# Patient Record
Sex: FEMALE | Race: ASIAN | NOT HISPANIC OR LATINO
[De-identification: names, ages, dates, MRNs, and addresses within clinical notes are randomized per-mention and may not be internally consistent; named-entity substitution may affect disease eponyms.]

---

## 2023-03-09 ENCOUNTER — APPOINTMENT (OUTPATIENT)
Dept: ORTHOPEDIC SURGERY | Facility: CLINIC | Age: 78
End: 2023-03-09
Payer: MEDICARE

## 2023-03-09 VITALS — BODY MASS INDEX: 24.16 KG/M2 | HEIGHT: 65 IN | WEIGHT: 145 LBS

## 2023-03-09 DIAGNOSIS — M54.17 RADICULOPATHY, LUMBOSACRAL REGION: ICD-10-CM

## 2023-03-09 DIAGNOSIS — Z78.9 OTHER SPECIFIED HEALTH STATUS: ICD-10-CM

## 2023-03-09 DIAGNOSIS — M51.9 UNSPECIFIED THORACIC, THORACOLUMBAR AND LUMBOSACRAL INTERVERTEBRAL DISC DISORDER: ICD-10-CM

## 2023-03-09 DIAGNOSIS — Q76.49 OTHER CONGENITAL MALFORMATIONS OF SPINE, NOT ASSOCIATED WITH SCOLIOSIS: ICD-10-CM

## 2023-03-09 PROCEDURE — 99072 ADDL SUPL MATRL&STAF TM PHE: CPT

## 2023-03-09 PROCEDURE — 99204 OFFICE O/P NEW MOD 45 MIN: CPT

## 2023-03-09 PROCEDURE — 72110 X-RAY EXAM L-2 SPINE 4/>VWS: CPT

## 2023-03-09 PROCEDURE — 72170 X-RAY EXAM OF PELVIS: CPT

## 2023-03-09 RX ORDER — LIDOCAINE 40 MG/G
4 PATCH TOPICAL
Qty: 30 | Refills: 2 | Status: ACTIVE | COMMUNITY
Start: 2023-03-09 | End: 1900-01-01

## 2023-03-09 NOTE — IMAGING
[Facet arthropathy] : Facet arthropathy [Disc space narrowing] : Disc space narrowing [AP] : anteroposterior [There are no fractures, subluxations or dislocations. No significant abnormalities are seen] : There are no fractures, subluxations or dislocations. No significant abnormalities are seen [Moderate arthritis (Tonnis Grade 2)] : Moderate arthritis (Tonnis Grade 2) [de-identified] : LSPINE\par Inspection: No rash or ecchymosis\par Palpation:midline lumbar tenderness, left lumbar paraspinal tenderness\par ROM: diminished all planes\par Strength: 5/5 bilateral hip flexors, knee extensors, ankle dorsiflexors, EHL, ankle plantarflexors\par Sensation: Sensation present to light touch bilateral L2-S1 distributions\par Provocative maneuvers: Negative bilateral straight leg raise\par \par \par Bilateral hips-\par Palpation: No tenderness to palpation over greater trochanter or IT band\par \par  [FreeTextEntry1] : DDD most severe L5-S1\par chronic appearing vertebral wedging L4\par Bertolotti's syndrome (left side)

## 2023-03-09 NOTE — HISTORY OF PRESENT ILLNESS
[Lower back] : lower back [Sudden] : sudden [7] : 7 [Radiating] : radiating [Tingling] : tingling [Constant] : constant [Physical therapy] : physical therapy [Standing] : standing [Walking] : walking [Retired] : Work status: retired [de-identified] : 3/9/23- 78 y/o F presents for lower back pain X 6-7 years. Associated radiation down BLEs (bilateral anterior shins/posterior calves/dorsal feet), with N/T. Denies recent injuries. Aggravated by prolonged walking and going up/down stairs. Has done PT in the past, most recently 2 months ago, with relief. Denies prior back surgeries. Denies b/b dysfunction. Had lumbar MRI done approx. 1 year ago (does not have report/images). \par \par PMH: GERD [] : no [de-identified] : about 2 months ago [de-identified] : mri

## 2023-03-09 NOTE — ASSESSMENT
[FreeTextEntry1] : lumbosacral DDD with BLE radic\par left-sided Bertolotti's syndrome \par chronic-appearing vertebral wedging L4; no reported acute trauma\par PT, topicals\par f/u 6 weeks\par consider imaging if no improvement\par \par Patient seen by Brigette Bahena PA-C,  with and under the supervision of Dr. Khoi Mendoza M.D.\par

## 2023-03-17 ENCOUNTER — APPOINTMENT (OUTPATIENT)
Dept: ORTHOPEDIC SURGERY | Facility: CLINIC | Age: 78
End: 2023-03-17
Payer: MEDICARE

## 2023-03-17 VITALS — WEIGHT: 145 LBS | HEIGHT: 65 IN | BODY MASS INDEX: 24.16 KG/M2

## 2023-03-17 DIAGNOSIS — M17.11 UNILATERAL PRIMARY OSTEOARTHRITIS, RIGHT KNEE: ICD-10-CM

## 2023-03-17 DIAGNOSIS — M17.12 UNILATERAL PRIMARY OSTEOARTHRITIS, LEFT KNEE: ICD-10-CM

## 2023-03-17 DIAGNOSIS — G89.29 LOW BACK PAIN, UNSPECIFIED: ICD-10-CM

## 2023-03-17 DIAGNOSIS — M54.50 LOW BACK PAIN, UNSPECIFIED: ICD-10-CM

## 2023-03-17 PROCEDURE — 99072 ADDL SUPL MATRL&STAF TM PHE: CPT

## 2023-03-17 PROCEDURE — 73564 X-RAY EXAM KNEE 4 OR MORE: CPT | Mod: 50

## 2023-03-17 PROCEDURE — 99213 OFFICE O/P EST LOW 20 MIN: CPT

## 2023-03-17 NOTE — HISTORY OF PRESENT ILLNESS
[Gradual] : gradual [8] : 8 [Dull/Aching] : dull/aching [Sharp] : sharp [Nothing helps with pain getting better] : Nothing helps with pain getting better [Retired] : Work status: retired [de-identified] : 03/17/2023 Ms. NANETTE JONAS is a 78 years old  F who  presents today for evaluation of Norris knee. pt states she has arthritis. she has been experiencing pain for the past 7 years  [] : no

## 2023-03-17 NOTE — IMAGING
[de-identified] : Constitutional: well developed and well nourished, able to communicate\par Cardiovascular: Peripheral vascular exam is grossly normal\par Neurologic: Alert and oriented, no acute distress.\par Skin: normal skin with no ulcers, rashes, or lesions\par Pulmonary: No respiratory distress, breathing comfortably on room air\par Lymphatics: No obvious lymphadenopathy or lymphedema in areas examined\par \par RIGHT KNEE EXAM\par Alignment: Varus \par Effusion: None\par Atrophy: None                                                 \par Stable to Varus/valgus stress\par Posterior Drawer Test: negative\par Anterior Drawer Test: Negative\par Knee Extension/Flexion: 0 / 120\par \par Medial/lateral compartments\par Medial joint line: No Tenderness\par Lateral joint line: No Tenderness\par Taty test: negative\par \par Patellofemoral joint\par Medial patellar facet: no tenderness\par Patellar grind: Negative\par \par Tendons:\par Pes Anserine: No tenderness\par Gerdy’s Tubercle/ IT Band: No tenderness\par Quadriceps Tendon: No Tenderness\par patellar tendon: no Tenderness\par Tibial tubercle: not tenderness\par Calf: no Tenderness\par \par Neurovascular exam\par Muscle strength: 5/5\par Sensation to light touch: intact\par Distal pulses: 2+\par \par LEFT KNEE EXAM\par Alignment: Valgus\par Effusion: None\par Atrophy: None                                                 \par Stable to Varus/valgus stress\par Posterior Drawer Test: negative\par Anterior Drawer Test: Negative\par Knee Extension/Flexion: 0 / 120\par \par Medial/lateral compartments\par Medial joint line: No Tenderness\par Lateral joint line: No Tenderness\par Taty test: negative\par \par Patellofemoral joint\par Medial patellar facet: no tenderness\par Patellar grind: Negative\par \par Tendons:\par Pes Anserine: No tenderness\par Gerdy’s Tubercle/ IT Band: No tenderness\par Quadriceps Tendon: No Tenderness\par patellar tendon: no Tenderness\par Tibial tubercle: not tenderness\par Calf: no Tenderness\par \par Neurovascular exam\par Muscle strength: 5/5\par Sensation to light touch: intact\par Distal pulses: 2+\par \par IMAGING:\par 03/17/2023 Xrays of the Left Knee were taken demonstrating \par \par IMAGING:\par 03/17/2023 Xrays of the bilateral Knee were taken demonstrating Right knee medial compartment wear and left knee lateral compartment wear. tricompartmental arthritis with joint space collapse, osteophytes, and sclerosis

## 2023-03-17 NOTE — ASSESSMENT
[FreeTextEntry1] : 78 year F with bilateral knee OA and lumbago\par - physical therapy, NSAIDs\par - Return in 6 weeks for follow up PRN

## 2023-05-05 ENCOUNTER — APPOINTMENT (OUTPATIENT)
Dept: ORTHOPEDIC SURGERY | Facility: CLINIC | Age: 78
End: 2023-05-05

## 2023-08-24 ENCOUNTER — APPOINTMENT (OUTPATIENT)
Dept: OTOLARYNGOLOGY | Facility: CLINIC | Age: 78
End: 2023-08-24
Payer: MEDICARE

## 2023-08-24 VITALS
WEIGHT: 140 LBS | OXYGEN SATURATION: 98 % | HEART RATE: 70 BPM | BODY MASS INDEX: 23.32 KG/M2 | HEIGHT: 65 IN | DIASTOLIC BLOOD PRESSURE: 76 MMHG | SYSTOLIC BLOOD PRESSURE: 134 MMHG

## 2023-08-24 PROCEDURE — 31575 DIAGNOSTIC LARYNGOSCOPY: CPT

## 2023-08-24 PROCEDURE — 99203 OFFICE O/P NEW LOW 30 MIN: CPT | Mod: 25

## 2023-08-24 NOTE — ASSESSMENT
[FreeTextEntry1] : Ms. JONAS is a 78 year female s/p one episode of vomiting with small amount of blood 5 weeks ago with no further incidences. On scope she does have + evidence of reflux, no masses seen today- no evidence of upper airway bleeding   LPRD - can c/w PPI and Famotidine until seen by GI - keep f/u with GI next month

## 2023-08-24 NOTE — REASON FOR VISIT
[Initial Consultation] : an initial consultation for [Family Member] : family member [FreeTextEntry2] : blood in sputum

## 2023-08-24 NOTE — CONSULT LETTER
[Dear  ___] : Dear  [unfilled], [Consult Letter:] : I had the pleasure of evaluating your patient, [unfilled]. [Sincerely,] : Sincerely, [DrGeorge  ___] : Dr. GOMEZ [FreeTextEntry2] : [facility] [FreeTextEntry3] : Padmini Lisa MD Otolaryngology- Facial Plastics  [address] [place] [id number] (P) - [phone] (F) - [phone]

## 2023-08-24 NOTE — PROCEDURE
[de-identified] : reason for laryngoscopy: unable to cooperate with mirror   Fiberoptic laryngoscopy was performed on the patient.  R/b/a was explained to the patient and they agreed to proceed with procedure.  Nasal cavities were treated with lidocaine and afrin prior to laryngoscopy for comfort.  Nasal cavities: clear b/l, Nasopharynx: mild erythema and swelling, Oropharynx/Hypopharynx: + lingual tonsillar hypertrophy no masses or lesions, posterior pharyngeal wall with cobblestoning.  No BOT hypertrophy, vallecula is clear of any masses or cysts, Supraglottis: epiglottis sharp with normal bilateral arytenoids, aryepiglottic folds, ventricles but with + interarytenoid edema consistent with reflux, Glottis: clear, TVCs mobile b/l.  Subglottis clear  No pooling of secretions in the pyriform sinus.  Airway patent  Scope #: 210

## 2023-08-24 NOTE — HISTORY OF PRESENT ILLNESS
[de-identified] : Ms. JONAS is a 78 year female here with son pt was treated with Ciprofloxin for UTI 5 weeks ago which caused stomach upset, at that time she coughed one time, vomited and noticed there was blood in the vomit. denies sob. she was seen by GI who started her on Omeprazole 40mg q am and Pepcid qpm for reflux and referred her to ENT - takes Asa 81 mg on occasion - denies epistaxis or tobacco history  denies sinus pressure, rhinorrhea, nasal obstruction

## 2023-10-11 ENCOUNTER — APPOINTMENT (OUTPATIENT)
Dept: GASTROENTEROLOGY | Facility: CLINIC | Age: 78
End: 2023-10-11

## 2023-11-03 ENCOUNTER — EMERGENCY (EMERGENCY)
Facility: HOSPITAL | Age: 78
LOS: 1 days | Discharge: HOME | End: 2023-11-03
Attending: EMERGENCY MEDICINE
Payer: MEDICARE

## 2023-11-03 VITALS
TEMPERATURE: 98 F | SYSTOLIC BLOOD PRESSURE: 148 MMHG | RESPIRATION RATE: 20 BRPM | WEIGHT: 138.01 LBS | HEART RATE: 82 BPM | DIASTOLIC BLOOD PRESSURE: 86 MMHG

## 2023-11-03 DIAGNOSIS — Z98.891 HISTORY OF UTERINE SCAR FROM PREVIOUS SURGERY: Chronic | ICD-10-CM

## 2023-11-03 LAB
ALBUMIN SERPL ELPH-MCNC: 4.1 G/DL — SIGNIFICANT CHANGE UP (ref 3.3–5)
ALBUMIN SERPL ELPH-MCNC: 4.1 G/DL — SIGNIFICANT CHANGE UP (ref 3.3–5)
ALP SERPL-CCNC: 92 U/L — SIGNIFICANT CHANGE UP (ref 40–120)
ALP SERPL-CCNC: 92 U/L — SIGNIFICANT CHANGE UP (ref 40–120)
ALT FLD-CCNC: 16 U/L — SIGNIFICANT CHANGE UP (ref 10–45)
ALT FLD-CCNC: 16 U/L — SIGNIFICANT CHANGE UP (ref 10–45)
ANION GAP SERPL CALC-SCNC: 11 MMOL/L — SIGNIFICANT CHANGE UP (ref 5–17)
ANION GAP SERPL CALC-SCNC: 11 MMOL/L — SIGNIFICANT CHANGE UP (ref 5–17)
APPEARANCE UR: CLEAR — SIGNIFICANT CHANGE UP
APPEARANCE UR: CLEAR — SIGNIFICANT CHANGE UP
AST SERPL-CCNC: 19 U/L — SIGNIFICANT CHANGE UP (ref 10–40)
AST SERPL-CCNC: 19 U/L — SIGNIFICANT CHANGE UP (ref 10–40)
BACTERIA # UR AUTO: NEGATIVE /HPF — SIGNIFICANT CHANGE UP
BACTERIA # UR AUTO: NEGATIVE /HPF — SIGNIFICANT CHANGE UP
BASE EXCESS BLDV CALC-SCNC: 0.6 MMOL/L — SIGNIFICANT CHANGE UP (ref -2–3)
BASE EXCESS BLDV CALC-SCNC: 0.6 MMOL/L — SIGNIFICANT CHANGE UP (ref -2–3)
BASOPHILS # BLD AUTO: 0.09 K/UL — SIGNIFICANT CHANGE UP (ref 0–0.2)
BASOPHILS # BLD AUTO: 0.09 K/UL — SIGNIFICANT CHANGE UP (ref 0–0.2)
BASOPHILS NFR BLD AUTO: 0.8 % — SIGNIFICANT CHANGE UP (ref 0–2)
BASOPHILS NFR BLD AUTO: 0.8 % — SIGNIFICANT CHANGE UP (ref 0–2)
BILIRUB SERPL-MCNC: 0.3 MG/DL — SIGNIFICANT CHANGE UP (ref 0.2–1.2)
BILIRUB SERPL-MCNC: 0.3 MG/DL — SIGNIFICANT CHANGE UP (ref 0.2–1.2)
BILIRUB UR-MCNC: NEGATIVE — SIGNIFICANT CHANGE UP
BILIRUB UR-MCNC: NEGATIVE — SIGNIFICANT CHANGE UP
BUN SERPL-MCNC: 18 MG/DL — SIGNIFICANT CHANGE UP (ref 7–23)
BUN SERPL-MCNC: 18 MG/DL — SIGNIFICANT CHANGE UP (ref 7–23)
CA-I SERPL-SCNC: 1.29 MMOL/L — SIGNIFICANT CHANGE UP (ref 1.15–1.33)
CA-I SERPL-SCNC: 1.29 MMOL/L — SIGNIFICANT CHANGE UP (ref 1.15–1.33)
CALCIUM SERPL-MCNC: 9.7 MG/DL — SIGNIFICANT CHANGE UP (ref 8.4–10.5)
CALCIUM SERPL-MCNC: 9.7 MG/DL — SIGNIFICANT CHANGE UP (ref 8.4–10.5)
CAST: 0 /LPF — SIGNIFICANT CHANGE UP (ref 0–4)
CAST: 0 /LPF — SIGNIFICANT CHANGE UP (ref 0–4)
CHLORIDE BLDV-SCNC: 104 MMOL/L — SIGNIFICANT CHANGE UP (ref 96–108)
CHLORIDE BLDV-SCNC: 104 MMOL/L — SIGNIFICANT CHANGE UP (ref 96–108)
CHLORIDE SERPL-SCNC: 104 MMOL/L — SIGNIFICANT CHANGE UP (ref 96–108)
CHLORIDE SERPL-SCNC: 104 MMOL/L — SIGNIFICANT CHANGE UP (ref 96–108)
CO2 BLDV-SCNC: 29 MMOL/L — HIGH (ref 22–26)
CO2 BLDV-SCNC: 29 MMOL/L — HIGH (ref 22–26)
CO2 SERPL-SCNC: 23 MMOL/L — SIGNIFICANT CHANGE UP (ref 22–31)
CO2 SERPL-SCNC: 23 MMOL/L — SIGNIFICANT CHANGE UP (ref 22–31)
COLOR SPEC: YELLOW — SIGNIFICANT CHANGE UP
COLOR SPEC: YELLOW — SIGNIFICANT CHANGE UP
CREAT SERPL-MCNC: 0.76 MG/DL — SIGNIFICANT CHANGE UP (ref 0.5–1.3)
CREAT SERPL-MCNC: 0.76 MG/DL — SIGNIFICANT CHANGE UP (ref 0.5–1.3)
DIFF PNL FLD: ABNORMAL
DIFF PNL FLD: ABNORMAL
EGFR: 80 ML/MIN/1.73M2 — SIGNIFICANT CHANGE UP
EGFR: 80 ML/MIN/1.73M2 — SIGNIFICANT CHANGE UP
EOSINOPHIL # BLD AUTO: 0.16 K/UL — SIGNIFICANT CHANGE UP (ref 0–0.5)
EOSINOPHIL # BLD AUTO: 0.16 K/UL — SIGNIFICANT CHANGE UP (ref 0–0.5)
EOSINOPHIL NFR BLD AUTO: 1.4 % — SIGNIFICANT CHANGE UP (ref 0–6)
EOSINOPHIL NFR BLD AUTO: 1.4 % — SIGNIFICANT CHANGE UP (ref 0–6)
GAS PNL BLDV: 134 MMOL/L — LOW (ref 136–145)
GAS PNL BLDV: 134 MMOL/L — LOW (ref 136–145)
GAS PNL BLDV: SIGNIFICANT CHANGE UP
GAS PNL BLDV: SIGNIFICANT CHANGE UP
GLUCOSE BLDV-MCNC: 98 MG/DL — SIGNIFICANT CHANGE UP (ref 70–99)
GLUCOSE BLDV-MCNC: 98 MG/DL — SIGNIFICANT CHANGE UP (ref 70–99)
GLUCOSE SERPL-MCNC: 100 MG/DL — HIGH (ref 70–99)
GLUCOSE SERPL-MCNC: 100 MG/DL — HIGH (ref 70–99)
GLUCOSE UR QL: NEGATIVE MG/DL — SIGNIFICANT CHANGE UP
GLUCOSE UR QL: NEGATIVE MG/DL — SIGNIFICANT CHANGE UP
HCO3 BLDV-SCNC: 27 MMOL/L — SIGNIFICANT CHANGE UP (ref 22–29)
HCO3 BLDV-SCNC: 27 MMOL/L — SIGNIFICANT CHANGE UP (ref 22–29)
HCT VFR BLD CALC: 37.2 % — SIGNIFICANT CHANGE UP (ref 34.5–45)
HCT VFR BLD CALC: 37.2 % — SIGNIFICANT CHANGE UP (ref 34.5–45)
HCT VFR BLDA CALC: 38 % — SIGNIFICANT CHANGE UP (ref 34.5–46.5)
HCT VFR BLDA CALC: 38 % — SIGNIFICANT CHANGE UP (ref 34.5–46.5)
HGB BLD CALC-MCNC: 12.7 G/DL — SIGNIFICANT CHANGE UP (ref 11.7–16.1)
HGB BLD CALC-MCNC: 12.7 G/DL — SIGNIFICANT CHANGE UP (ref 11.7–16.1)
HGB BLD-MCNC: 12.4 G/DL — SIGNIFICANT CHANGE UP (ref 11.5–15.5)
HGB BLD-MCNC: 12.4 G/DL — SIGNIFICANT CHANGE UP (ref 11.5–15.5)
IMM GRANULOCYTES NFR BLD AUTO: 0.4 % — SIGNIFICANT CHANGE UP (ref 0–0.9)
IMM GRANULOCYTES NFR BLD AUTO: 0.4 % — SIGNIFICANT CHANGE UP (ref 0–0.9)
KETONES UR-MCNC: NEGATIVE MG/DL — SIGNIFICANT CHANGE UP
KETONES UR-MCNC: NEGATIVE MG/DL — SIGNIFICANT CHANGE UP
LACTATE BLDV-MCNC: 0.9 MMOL/L — SIGNIFICANT CHANGE UP (ref 0.5–2)
LACTATE BLDV-MCNC: 0.9 MMOL/L — SIGNIFICANT CHANGE UP (ref 0.5–2)
LEUKOCYTE ESTERASE UR-ACNC: ABNORMAL
LEUKOCYTE ESTERASE UR-ACNC: ABNORMAL
LYMPHOCYTES # BLD AUTO: 2.7 K/UL — SIGNIFICANT CHANGE UP (ref 1–3.3)
LYMPHOCYTES # BLD AUTO: 2.7 K/UL — SIGNIFICANT CHANGE UP (ref 1–3.3)
LYMPHOCYTES # BLD AUTO: 24 % — SIGNIFICANT CHANGE UP (ref 13–44)
LYMPHOCYTES # BLD AUTO: 24 % — SIGNIFICANT CHANGE UP (ref 13–44)
MAGNESIUM SERPL-MCNC: 2.4 MG/DL — SIGNIFICANT CHANGE UP (ref 1.6–2.6)
MAGNESIUM SERPL-MCNC: 2.4 MG/DL — SIGNIFICANT CHANGE UP (ref 1.6–2.6)
MCHC RBC-ENTMCNC: 29.4 PG — SIGNIFICANT CHANGE UP (ref 27–34)
MCHC RBC-ENTMCNC: 29.4 PG — SIGNIFICANT CHANGE UP (ref 27–34)
MCHC RBC-ENTMCNC: 33.3 GM/DL — SIGNIFICANT CHANGE UP (ref 32–36)
MCHC RBC-ENTMCNC: 33.3 GM/DL — SIGNIFICANT CHANGE UP (ref 32–36)
MCV RBC AUTO: 88.2 FL — SIGNIFICANT CHANGE UP (ref 80–100)
MCV RBC AUTO: 88.2 FL — SIGNIFICANT CHANGE UP (ref 80–100)
MONOCYTES # BLD AUTO: 1.14 K/UL — HIGH (ref 0–0.9)
MONOCYTES # BLD AUTO: 1.14 K/UL — HIGH (ref 0–0.9)
MONOCYTES NFR BLD AUTO: 10.1 % — SIGNIFICANT CHANGE UP (ref 2–14)
MONOCYTES NFR BLD AUTO: 10.1 % — SIGNIFICANT CHANGE UP (ref 2–14)
NEUTROPHILS # BLD AUTO: 7.11 K/UL — SIGNIFICANT CHANGE UP (ref 1.8–7.4)
NEUTROPHILS # BLD AUTO: 7.11 K/UL — SIGNIFICANT CHANGE UP (ref 1.8–7.4)
NEUTROPHILS NFR BLD AUTO: 63.3 % — SIGNIFICANT CHANGE UP (ref 43–77)
NEUTROPHILS NFR BLD AUTO: 63.3 % — SIGNIFICANT CHANGE UP (ref 43–77)
NITRITE UR-MCNC: NEGATIVE — SIGNIFICANT CHANGE UP
NITRITE UR-MCNC: NEGATIVE — SIGNIFICANT CHANGE UP
NRBC # BLD: 0 /100 WBCS — SIGNIFICANT CHANGE UP (ref 0–0)
NRBC # BLD: 0 /100 WBCS — SIGNIFICANT CHANGE UP (ref 0–0)
PCO2 BLDV: 52 MMHG — HIGH (ref 39–42)
PCO2 BLDV: 52 MMHG — HIGH (ref 39–42)
PH BLDV: 7.33 — SIGNIFICANT CHANGE UP (ref 7.32–7.43)
PH BLDV: 7.33 — SIGNIFICANT CHANGE UP (ref 7.32–7.43)
PH UR: 6.5 — SIGNIFICANT CHANGE UP (ref 5–8)
PH UR: 6.5 — SIGNIFICANT CHANGE UP (ref 5–8)
PLATELET # BLD AUTO: 285 K/UL — SIGNIFICANT CHANGE UP (ref 150–400)
PLATELET # BLD AUTO: 285 K/UL — SIGNIFICANT CHANGE UP (ref 150–400)
PO2 BLDV: 25 MMHG — SIGNIFICANT CHANGE UP (ref 25–45)
PO2 BLDV: 25 MMHG — SIGNIFICANT CHANGE UP (ref 25–45)
POTASSIUM BLDV-SCNC: 4.6 MMOL/L — SIGNIFICANT CHANGE UP (ref 3.5–5.1)
POTASSIUM BLDV-SCNC: 4.6 MMOL/L — SIGNIFICANT CHANGE UP (ref 3.5–5.1)
POTASSIUM SERPL-MCNC: 4.7 MMOL/L — SIGNIFICANT CHANGE UP (ref 3.5–5.3)
POTASSIUM SERPL-MCNC: 4.7 MMOL/L — SIGNIFICANT CHANGE UP (ref 3.5–5.3)
POTASSIUM SERPL-SCNC: 4.7 MMOL/L — SIGNIFICANT CHANGE UP (ref 3.5–5.3)
POTASSIUM SERPL-SCNC: 4.7 MMOL/L — SIGNIFICANT CHANGE UP (ref 3.5–5.3)
PROT SERPL-MCNC: 7.1 G/DL — SIGNIFICANT CHANGE UP (ref 6–8.3)
PROT SERPL-MCNC: 7.1 G/DL — SIGNIFICANT CHANGE UP (ref 6–8.3)
PROT UR-MCNC: NEGATIVE MG/DL — SIGNIFICANT CHANGE UP
PROT UR-MCNC: NEGATIVE MG/DL — SIGNIFICANT CHANGE UP
RBC # BLD: 4.22 M/UL — SIGNIFICANT CHANGE UP (ref 3.8–5.2)
RBC # BLD: 4.22 M/UL — SIGNIFICANT CHANGE UP (ref 3.8–5.2)
RBC # FLD: 13.3 % — SIGNIFICANT CHANGE UP (ref 10.3–14.5)
RBC # FLD: 13.3 % — SIGNIFICANT CHANGE UP (ref 10.3–14.5)
RBC CASTS # UR COMP ASSIST: 2 /HPF — SIGNIFICANT CHANGE UP (ref 0–4)
RBC CASTS # UR COMP ASSIST: 2 /HPF — SIGNIFICANT CHANGE UP (ref 0–4)
SAO2 % BLDV: 33.4 % — LOW (ref 67–88)
SAO2 % BLDV: 33.4 % — LOW (ref 67–88)
SODIUM SERPL-SCNC: 138 MMOL/L — SIGNIFICANT CHANGE UP (ref 135–145)
SODIUM SERPL-SCNC: 138 MMOL/L — SIGNIFICANT CHANGE UP (ref 135–145)
SP GR SPEC: 1.01 — SIGNIFICANT CHANGE UP (ref 1–1.03)
SP GR SPEC: 1.01 — SIGNIFICANT CHANGE UP (ref 1–1.03)
SQUAMOUS # UR AUTO: 2 /HPF — SIGNIFICANT CHANGE UP (ref 0–5)
SQUAMOUS # UR AUTO: 2 /HPF — SIGNIFICANT CHANGE UP (ref 0–5)
UROBILINOGEN FLD QL: 0.2 MG/DL — SIGNIFICANT CHANGE UP (ref 0.2–1)
UROBILINOGEN FLD QL: 0.2 MG/DL — SIGNIFICANT CHANGE UP (ref 0.2–1)
WBC # BLD: 11.25 K/UL — HIGH (ref 3.8–10.5)
WBC # BLD: 11.25 K/UL — HIGH (ref 3.8–10.5)
WBC # FLD AUTO: 11.25 K/UL — HIGH (ref 3.8–10.5)
WBC # FLD AUTO: 11.25 K/UL — HIGH (ref 3.8–10.5)
WBC UR QL: 2 /HPF — SIGNIFICANT CHANGE UP (ref 0–5)
WBC UR QL: 2 /HPF — SIGNIFICANT CHANGE UP (ref 0–5)

## 2023-11-03 PROCEDURE — 82330 ASSAY OF CALCIUM: CPT

## 2023-11-03 PROCEDURE — 85014 HEMATOCRIT: CPT

## 2023-11-03 PROCEDURE — 93005 ELECTROCARDIOGRAM TRACING: CPT

## 2023-11-03 PROCEDURE — 80053 COMPREHEN METABOLIC PANEL: CPT

## 2023-11-03 PROCEDURE — 87077 CULTURE AEROBIC IDENTIFY: CPT

## 2023-11-03 PROCEDURE — 84295 ASSAY OF SERUM SODIUM: CPT

## 2023-11-03 PROCEDURE — 85018 HEMOGLOBIN: CPT

## 2023-11-03 PROCEDURE — 82435 ASSAY OF BLOOD CHLORIDE: CPT

## 2023-11-03 PROCEDURE — 82803 BLOOD GASES ANY COMBINATION: CPT

## 2023-11-03 PROCEDURE — 99285 EMERGENCY DEPT VISIT HI MDM: CPT | Mod: 25

## 2023-11-03 PROCEDURE — 84132 ASSAY OF SERUM POTASSIUM: CPT

## 2023-11-03 PROCEDURE — 82947 ASSAY GLUCOSE BLOOD QUANT: CPT

## 2023-11-03 PROCEDURE — 83735 ASSAY OF MAGNESIUM: CPT

## 2023-11-03 PROCEDURE — 99285 EMERGENCY DEPT VISIT HI MDM: CPT

## 2023-11-03 PROCEDURE — 96374 THER/PROPH/DIAG INJ IV PUSH: CPT

## 2023-11-03 PROCEDURE — 71045 X-RAY EXAM CHEST 1 VIEW: CPT | Mod: 26

## 2023-11-03 PROCEDURE — 85025 COMPLETE CBC W/AUTO DIFF WBC: CPT

## 2023-11-03 PROCEDURE — 87186 SC STD MICRODIL/AGAR DIL: CPT

## 2023-11-03 PROCEDURE — 87086 URINE CULTURE/COLONY COUNT: CPT

## 2023-11-03 PROCEDURE — 81001 URINALYSIS AUTO W/SCOPE: CPT

## 2023-11-03 PROCEDURE — 83605 ASSAY OF LACTIC ACID: CPT

## 2023-11-03 PROCEDURE — 71045 X-RAY EXAM CHEST 1 VIEW: CPT

## 2023-11-03 RX ORDER — ACETAMINOPHEN 500 MG
1000 TABLET ORAL ONCE
Refills: 0 | Status: COMPLETED | OUTPATIENT
Start: 2023-11-03 | End: 2023-11-03

## 2023-11-03 RX ORDER — CEFPODOXIME PROXETIL 100 MG
200 TABLET ORAL ONCE
Refills: 0 | Status: COMPLETED | OUTPATIENT
Start: 2023-11-03 | End: 2023-11-03

## 2023-11-03 RX ORDER — LIDOCAINE 4 G/100G
1 CREAM TOPICAL ONCE
Refills: 0 | Status: COMPLETED | OUTPATIENT
Start: 2023-11-03 | End: 2023-11-03

## 2023-11-03 RX ADMIN — Medication 200 MILLIGRAM(S): at 22:38

## 2023-11-03 RX ADMIN — Medication 400 MILLIGRAM(S): at 21:24

## 2023-11-03 RX ADMIN — LIDOCAINE 1 PATCH: 4 CREAM TOPICAL at 21:24

## 2023-11-03 RX ADMIN — Medication 1000 MILLIGRAM(S): at 22:34

## 2023-11-03 NOTE — ED PROVIDER NOTE - NSFOLLOWUPINSTRUCTIONS_ED_ALL_ED_FT
You were seen in the ER today for an infection in your urine.     You tolerated an antibiotic called cefpodoxime while you were in the ER. I sent a prescription for this medication to your pharmacy.  Please take this medication 2 times per day to treat your urine infection.    For pain, you may take:  1) Acetaminophen (Tylenol): 650mg every 6 hours or as needed for pain     I also sent a prescription for lidocaine patches to help with your rib pain.  Your chest x-ray did not show any signs of a broken bone today.    Follow-up with your primary care doctor in the next 1 week to be reevaluated.    If you develop any new or worsening symptoms including chest pain, difficulty breathing, abdominal pain, blood in the urine, fevers, or you are otherwise concerned, come back to the ER right away to be evaluated.

## 2023-11-03 NOTE — ED ADULT TRIAGE NOTE - CHIEF COMPLAINT QUOTE
was seen in [facility] for dizziness and urinary frequency a week ago, son was told that patient has UTI, pt can't tolerate po antibiotic, son signed  pt out against medical advise

## 2023-11-03 NOTE — ED PROVIDER NOTE - RAPID ASSESSMENT
78-year-old female with past medical history of hypertension presenting with dizziness.  Patient reports that she had dizziness and generalized weakness 1 week ago.  Patient had a fall at that time and was taken to [facility].  At [facility] she was diagnosed with a UTI and was recommended that she stay for antibiotics, but patient refused and signed out AMA.  After leaving the hospital patient has not been on oral antibiotics because she did not want to take them.  Presenting to the ER today with persistent symptoms of dizziness and generalized fatigue.  Per family labs and CT at [facility] were unremarkable.    **Patient was rapidly assessed by me, Ge Dexter PA-C. A limited history was obtained. The patient will be seen and further examined/worked up in the main ED and their care will be completed by the main ED team. Receiving team will follow up on labs, analgesia, any clinical imaging, and perform reassessment and disposition of the patient as clinically indicated. All decisions regarding the progression of care will be made at their discretion.

## 2023-11-03 NOTE — ED PROVIDER NOTE - PROGRESS NOTE DETAILS
LAURA Perez MD PGY2: Patient tolerated p.o. cefpodoxime without difficulty.  Son comfortable with discharge home at this time.  Will send additional cefpodoxime to preferred pharmacy for 5-day course.  Chest x-ray without evidence of rib fracture.  We will send additional lidocaine patches to preferred pharmacy as well for analgesia.  Return precautions and follow-up plan were discussed with patient and son who expressed understanding.  She was discharged home with son in stable condition.

## 2023-11-03 NOTE — ED PROVIDER NOTE - CLINICAL SUMMARY MEDICAL DECISION MAKING FREE TEXT BOX
78-year-old female PMH HTN presenting with generalized weakness and urinary frequency after being found to have a urinary tract infection approximately 1 week ago at outside hospital that has not been fully treated with antibiotics.  Son is concerned that patient will not take anything other than IV medication due to fear of potential side effects from oral meds.  Does take other oral medications including her blood pressure medication without difficulty.  Will repeat blood work, UA/UC here and trial p.o. antibiotics if no indication for admission otherwise.

## 2023-11-03 NOTE — ED PROVIDER NOTE - PATIENT PORTAL LINK FT
You can access the FollowMyHealth Patient Portal offered by [facility] by registering at the following website: [email or web address]. By joining [employer]’s FollowMyHealth portal, you will also be able to view your health information using other applications (apps) compatible with our system.

## 2023-11-03 NOTE — ED PROVIDER NOTE - PHYSICAL EXAMINATION
GENERAL: Awake, alert, NAD  HEAD: NC/AT, neck supple, moist mucous membranes  EYES: chronic ocular changes 2/2 end stage glaucoma, sclera anicteric  LUNGS: normal WOB on RA, CTAB, no wheezes or crackles   CARDIAC: RRR, no m/r/g  ABDOMEN: Soft, non tender, non distended, no rebound, no guarding  BACK: No midline spinal tenderness, no CVA tenderness  EXT: No edema, no calf tenderness, no deformities.  NEURO: A&Ox3. Moving all extremities.  SKIN: Warm and dry. No rash.  PSYCH: Normal affect.

## 2023-11-03 NOTE — ED PROVIDER NOTE - OBJECTIVE STATEMENT
78Y F PMH HTN presenting with urinary frequency. 78Y F PMH HTN presenting with urinary frequency. Son at bedside is providing history and interpretation.  Patient reportedly had a fall approximately 1 week ago, at that time she was seen at [facility] and had negative trauma work-up per son.  She was found to have a UTI at that time and was recommended to stay in the hospital for IV antibiotics, however son felt that the wait time was too long and patient was not being cared for so she was discharged AMA.  She was prescribed p.o. Levaquin for her UTI son reports that she took 1 dose. 78Y F PMH HTN presenting with urinary frequency. Son at bedside is providing history and interpretation.  Patient reportedly had a fall approximately 1 week ago, at that time she was seen at [facility] and had negative trauma work-up per son.  She was found to have a UTI at that time and was recommended to stay in the hospital for IV antibiotics, however son felt that the wait time was too long and patient was not being cared for so she was discharged AMA.  She was prescribed p.o. Levaquin for her UTI son reports that she took 1 dose And then started having upset stomach so refused to continue course.  Last dose was about 4 days ago.  Patient continues to experience generalized weakness and urinary frequency so son brings her back to be evaluated for antibiotics.  No fevers, patient denies dysuria only endorsing frequency.

## 2023-11-03 NOTE — ED PROVIDER NOTE - ATTENDING CONTRIBUTION TO CARE
attending Carlene: 78yF h/o HTN p/w urinary frequency. Son at bedside is providing history and interpretation.  Patient reportedly had a fall approximately 1 week ago, at that time she was seen at [facility] and had negative trauma work-up per son.  She was found to have a UTI at that time, prescribed p.o. Levaquin but having trouble tolerating abx. Pt with continued symptoms. Exam as above. Will obtain labs, UA/Ucx, determine need for IV vs alternative po abx, reassess

## 2023-11-03 NOTE — ED ADULT NURSE NOTE - CHIEF COMPLAINT QUOTE
was seen in [facility] for dizziness and urinary frequency a week ago, son was told that patient has UTI, pt can't tolerate po antibiotic, son signed  pt out against medical advise

## 2023-11-03 NOTE — ED ADULT NURSE NOTE - OBJECTIVE STATEMENT
Pt is a 78y female whop presents to [facility] ED from triage c/o of urinary symptoms. Pt son at bedside translating, endorses last week pt had a fall and was seen at [facility], pt had a full workup and was found to have normal blood work but was found to have a UTI, pt was given a dose of IV antibiotic and family signed pt out AMA as pt did not want to stay in the hospital for IV antibiotics, pt sent home with PO antibiotic Levaquin, endorses taking only one dose and then she had "gum pain/bleeding" so she stopped taking it. PT currently endorses some R sided rib pain s/p fall, increased urinary frequency and gum discomfort. PMH HTN. Pt is A&Ox4 currently denying any HA, SOB, cough, CP, palpitations, abd pain, n/v/d/c. Pt legally blind b/l eyes, wheelchair used to transport pt. Pt denies blood thinner use. Peripheral IV placed by ED RN, 20G in R AC.

## 2023-11-04 VITALS
HEART RATE: 74 BPM | TEMPERATURE: 98 F | SYSTOLIC BLOOD PRESSURE: 129 MMHG | DIASTOLIC BLOOD PRESSURE: 77 MMHG | RESPIRATION RATE: 16 BRPM | OXYGEN SATURATION: 96 %

## 2023-11-04 RX ORDER — CEFPODOXIME PROXETIL 100 MG
1 TABLET ORAL
Qty: 9 | Refills: 0
Start: 2023-11-04 | End: 2023-11-08

## 2023-11-04 RX ORDER — LIDOCAINE 4 G/100G
1 CREAM TOPICAL
Qty: 1 | Refills: 0
Start: 2023-11-04

## 2023-11-07 LAB
-  AMPICILLIN: SIGNIFICANT CHANGE UP
-  AMPICILLIN: SIGNIFICANT CHANGE UP
-  CIPROFLOXACIN: SIGNIFICANT CHANGE UP
-  CIPROFLOXACIN: SIGNIFICANT CHANGE UP
-  LEVOFLOXACIN: SIGNIFICANT CHANGE UP
-  LEVOFLOXACIN: SIGNIFICANT CHANGE UP
-  NITROFURANTOIN: SIGNIFICANT CHANGE UP
-  NITROFURANTOIN: SIGNIFICANT CHANGE UP
-  TETRACYCLINE: SIGNIFICANT CHANGE UP
-  TETRACYCLINE: SIGNIFICANT CHANGE UP
-  VANCOMYCIN: SIGNIFICANT CHANGE UP
-  VANCOMYCIN: SIGNIFICANT CHANGE UP
CULTURE RESULTS: ABNORMAL
CULTURE RESULTS: ABNORMAL
METHOD TYPE: SIGNIFICANT CHANGE UP
METHOD TYPE: SIGNIFICANT CHANGE UP
ORGANISM # SPEC MICROSCOPIC CNT: ABNORMAL
SPECIMEN SOURCE: SIGNIFICANT CHANGE UP
SPECIMEN SOURCE: SIGNIFICANT CHANGE UP

## 2023-11-07 RX ORDER — NITROFURANTOIN MACROCRYSTAL 50 MG
1 CAPSULE ORAL
Qty: 10 | Refills: 0
Start: 2023-11-07 | End: 2023-11-11